# Patient Record
(demographics unavailable — no encounter records)

---

## 2025-02-21 NOTE — HISTORY OF PRESENT ILLNESS
[FreeTextEntry1] : The patient is a 30-year-old G1, P1 premenopausal white female of [place] and [place] descent.  She underwent menarche at age 15.  Her mother is Wendi Saldivar who is a patient of ours who underwent bilateral nipple sparing mastectomies for breast cancer at age 44 and is doing well.  Her mother tested BRCA1 positive with an IVS8+2TA mutation.  She has a maternal grandmother who had breast cancer at age 44 and an ovarian cancer at age 49.  Her paternal grandfather had bladder cancer in his 60s and her maternal grandfather had lung and prostate cancer.  The patient herself underwent [employer] genetic panel testing in December 2020 and was found to have the same BRCA1 mutation as her mother which is also known as a c.547+[id number]>A mutation.  The patient has a history of PCOS and is on Loestrin.  She understands the risk reduction strategies for breast cancer including benefits for risk reduction prophylactic mastectomy, tamoxifen for risk reduction, as well as the need for close surveillance with mammography, ultrasound, and MRIs.  She did undergo a left breast 1:00 MRI guided core biopsy on December 9, 2021 for an MRI finding which showed benign fibrocystic changes and fibroadenomatoid change.  She is about 1 year after her pregnancy and stopped breast-feeding in December 2024.  I have been holding off on her breast imaging secondary to her pregnancy and breast-feeding.  She comes in now for routine follow-up and close surveillance given this known BRCA1 mutation.

## 2025-02-21 NOTE — PAST MEDICAL HISTORY
[Menstruating] : The patient is menstruating [Menarche Age ____] : age at menarche was [unfilled] [Total Preg ___] : G[unfilled] [History of Hormone Replacement Treatment] : has no history of hormone replacement treatment

## 2025-02-21 NOTE — ASSESSMENT
[FreeTextEntry1] : The patient is a 30-year-old G1, P1 premenopausal white female of [place] and [place] descent. She underwent menarche at age 15. Her mother is Wendi Saldviar who is a patient of ours who underwent bilateral nipple sparing mastectomies for breast cancer at age 44 and is doing well. Her mother tested BRCA1 positive with an IVS8+2TA mutation. She has a maternal grandmother who had breast cancer at age 44 and an ovarian cancer at age 49. Her paternal grandfather had bladder cancer in his 60s and her maternal grandfather had lung and prostate cancer. The patient herself underwent [employer] genetic panel testing in December 2020 and was found to have the same BRCA1 mutation as her mother which is also known as a c.547+[id number]>A mutation. The patient has a history of PCOS and is on Loestrin. I spoke to the patient at length regarding her BRCA1 mutation and she is well aware of her increased risk of breast, ovarian, pancreatic, and melanoma cancers. I spoke to her about risk reduction techniques including risk reduction prophylactic mastectomy. The patient has been made aware of our technique of nipple sparing mastectomy with direct to implant reconstruction and she is well aware of this given her mother's history. She has chosen close surveillance for now with routine 6-month breast exams and to start yearly mammography, ultrasound, and MRIs. She understands the use of tamoxifen for risk reduction but given her age, she was not interested. She understands the need for close GYN follow-up for surveillance for ovarian cancer. The patient was seen by Leonora Castillo, our genetic counselor, and had a full discussion about screening and risk reduction options. She was also offered a plastic surgery consultation to discuss any future reconstruction possibilities after prophylactic mastectomy and did see Dr. Nicholas for discussion in the past. She did undergo a left breast 1:00 MRI guided core biopsy on December 9, 2021 for an MRI finding which showed benign fibrocystic changes and fibroadenomatoid change. She underwent her last bilateral mammography and ultrasound on June 16, 2023 performed at [facility] which showed extremely dense changes and no suspicious findings. She underwent her last bilateral breast MRI at [facility] on September 14, 2022 which showed some bilateral background enhancement but no suspicious findings.  She is about a year out after her pregnancy and she stopped breast-feeding around December 2024 so I had to hold off on imaging. On exam today, I cannot feel any suspicious densities in either breast and her breasts have softened up somewhat with pregnancy and breast-feeding.  I would like to see her again in 6 months around September 2025 due to her known BRCA1 mutation. I will restart imaging now and she was given scripts for bilateral mammography and ultrasound as well as an MRI which I would like her to get now prior to any further pregnancies.  The patient understands and agrees to proceed as planned.  The patient's gynecologist is at [facility] and I did refer the patient to Dr. Tolliver for consultation regarding ovarian cancer risk reduction screening and surveillance.

## 2025-02-21 NOTE — PHYSICAL EXAM
[Normocephalic] : normocephalic [Atraumatic] : atraumatic [EOMI] : extra ocular movement intact [Supple] : supple [No Supraclavicular Adenopathy] : no supraclavicular adenopathy [No Cervical Adenopathy] : no cervical adenopathy [Examined in the supine and seated position] : examined in the supine and seated position [Symmetrical] : symmetrical [No dominant masses] : no dominant masses in right breast  [No dominant masses] : no dominant masses left breast [No Nipple Retraction] : no left nipple retraction [No Nipple Discharge] : no left nipple discharge [Breast Mass Right Breast ___cm] : no masses [Breast Mass Left Breast ___cm] : no masses [No Axillary Lymphadenopathy] : no left axillary lymphadenopathy [No Edema] : no edema [No Rashes] : no rashes [No Ulceration] : no ulceration [Breast Nipple Inversion] : nipples not inverted [Breast Nipple Retraction] : nipples not retracted [Breast Nipple Flattening] : nipples not flattened [Breast Nipple Fissures] : nipples not fissured [Breast Abnormal Lactation (Galactorrhea)] : no galactorrhea [Breast Abnormal Secretion Bloody Fluid] : no bloody discharge [Breast Abnormal Secretion Serous Fluid] : no serous discharge [Breast Abnormal Secretion Opalescent Fluid] : no milky discharge [de-identified] : On exam, the patient has B-cup breasts.  She is about a year out after her pregnancy and she stopped breast-feeding in December 2024 and her breasts are much softer with no suspicious masses.  She has no axillary, supraclavicular, or cervical adenopathy.

## 2025-02-21 NOTE — ASSESSMENT
[FreeTextEntry1] : The patient is a 30-year-old G1, P1 premenopausal white female of [place] and [place] citizen descent. She underwent menarche at age 15. Her mother is Wendi Saldivar who is a patient of ours who underwent bilateral nipple sparing mastectomies for breast cancer at age 44 and is doing well. Her mother tested BRCA1 positive with an IVS8+2TA mutation. She has a maternal grandmother who had breast cancer at age 44 and an ovarian cancer at age 49. Her paternal grandfather had bladder cancer in his 60s and her maternal grandfather had lung and prostate cancer. The patient herself underwent [employer] genetic panel testing in December 2020 and was found to have the same BRCA1 mutation as her mother which is also known as a c.547+[id number]>A mutation. The patient has a history of PCOS and is on Loestrin. I spoke to the patient at length regarding her BRCA1 mutation and she is well aware of her increased risk of breast, ovarian, pancreatic, and melanoma cancers. I spoke to her about risk reduction techniques including risk reduction prophylactic mastectomy. The patient has been made aware of our technique of nipple sparing mastectomy with direct to implant reconstruction and she is well aware of this given her mother's history. She has chosen close surveillance for now with routine 6-month breast exams and to start yearly mammography, ultrasound, and MRIs. She understands the use of tamoxifen for risk reduction but given her age, she was not interested. She understands the need for close GYN follow-up for surveillance for ovarian cancer. The patient was seen by Leonora Castillo, our genetic counselor, and had a full discussion about screening and risk reduction options. She was also offered a plastic surgery consultation to discuss any future reconstruction possibilities after prophylactic mastectomy and did see Dr. Nicholas for discussion in the past. She did undergo a left breast 1:00 MRI guided core biopsy on December 9, 2021 for an MRI finding which showed benign fibrocystic changes and fibroadenomatoid change. She underwent her last bilateral mammography and ultrasound on June 16, 2023 performed at [facility] which showed extremely dense changes and no suspicious findings. She underwent her last bilateral breast MRI at [facility] on September 14, 2022 which showed some bilateral background enhancement but no suspicious findings.  She is about a year out after her pregnancy and she stopped breast-feeding around December 2024 so I had to hold off on imaging. On exam today, I cannot feel any suspicious densities in either breast and her breasts have softened up somewhat with pregnancy and breast-feeding.  I would like to see her again in 6 months around September 2025 due to her known BRCA1 mutation. I will restart imaging now and she was given scripts for bilateral mammography and ultrasound as well as an MRI which I would like her to get now prior to any further pregnancies.  The patient understands and agrees to proceed as planned.  The patient's gynecologist is at [facility] and I did refer the patient to Dr. Tolliver for consultation regarding ovarian cancer risk reduction screening and surveillance.

## 2025-02-21 NOTE — REASON FOR VISIT
[Follow-Up: _____] : a [unfilled] follow-up visit [FreeTextEntry1] : The patient comes in with a strong family history of breast cancer and a known BRCA1 mutation.

## 2025-02-21 NOTE — HISTORY OF PRESENT ILLNESS
[FreeTextEntry1] : The patient is a 30-year-old G1, P1 premenopausal white female of [place] and [place] descent.  She underwent menarche at age 15.  Her mother is Wendi Saldivar who is a patient of ours who underwent bilateral nipple sparing mastectomies for breast cancer at age 44 and is doing well.  Her mother tested BRCA1 positive with an IVS8+2TA mutation.  She has a maternal grandmother who had breast cancer at age 44 and an ovarian cancer at age 49.  Her paternal grandfather had bladder cancer in his 60s and her maternal grandfather had lung and prostate cancer.  The patient herself underwent [employer] genetic panel testing in December 2020 and was found to have the same BRCA1 mutation as her mother which is also known as a c.547+2T>A mutation.  The patient has a history of PCOS and is on Loestrin.  She understands the risk reduction strategies for breast cancer including benefits for risk reduction prophylactic mastectomy, tamoxifen for risk reduction, as well as the need for close surveillance with mammography, ultrasound, and MRIs.  She did undergo a left breast 1:00 MRI guided core biopsy on December 9, 2021 for an MRI finding which showed benign fibrocystic changes and fibroadenomatoid change.  She is about 1 year after her pregnancy and stopped breast-feeding in December 2024.  I have been holding off on her breast imaging secondary to her pregnancy and breast-feeding.  She comes in now for routine follow-up and close surveillance given this known BRCA1 mutation.

## 2025-02-21 NOTE — PHYSICAL EXAM
[Normocephalic] : normocephalic [Atraumatic] : atraumatic [EOMI] : extra ocular movement intact [Supple] : supple [No Supraclavicular Adenopathy] : no supraclavicular adenopathy [No Cervical Adenopathy] : no cervical adenopathy [Examined in the supine and seated position] : examined in the supine and seated position [Symmetrical] : symmetrical [No dominant masses] : no dominant masses in right breast  [No dominant masses] : no dominant masses left breast [No Nipple Retraction] : no left nipple retraction [No Nipple Discharge] : no left nipple discharge [Breast Mass Right Breast ___cm] : no masses [Breast Mass Left Breast ___cm] : no masses [No Axillary Lymphadenopathy] : no left axillary lymphadenopathy [No Edema] : no edema [No Rashes] : no rashes [No Ulceration] : no ulceration [Breast Nipple Inversion] : nipples not inverted [Breast Nipple Retraction] : nipples not retracted [Breast Nipple Flattening] : nipples not flattened [Breast Nipple Fissures] : nipples not fissured [Breast Abnormal Lactation (Galactorrhea)] : no galactorrhea [Breast Abnormal Secretion Bloody Fluid] : no bloody discharge [Breast Abnormal Secretion Serous Fluid] : no serous discharge [Breast Abnormal Secretion Opalescent Fluid] : no milky discharge [de-identified] : On exam, the patient has B-cup breasts.  She is about a year out after her pregnancy and she stopped breast-feeding in December 2024 and her breasts are much softer with no suspicious masses.  She has no axillary, supraclavicular, or cervical adenopathy.

## 2025-02-21 NOTE — ADDENDUM
[FreeTextEntry1] : I spent greater than 75% of the consultation in face-to-face counseling and coordination care in this patient with a strong family history of breast cancer and a known BRCA1 mutation who comes in now for breast cancer screening/surveillance.

## 2025-06-25 NOTE — HISTORY OF PRESENT ILLNESS
[FreeTextEntry1] : 30 year old female referred by Dr. Greenberg and  Dr. Laird for evaluation of Right upper lobe groundglass nodule that was incidentally found on Breast MRI. PMHX s/p left breast biopsy 6/12/2025 ~ biopsy showed benign tissue

## 2025-06-25 NOTE — DATA REVIEWED
[FreeTextEntry1] : 6/12/2025 CT Chest as follows: Impression: Corresponding to the nodule seen on breast MRI, there is a stable 1.7 cm groundglass nodule in the right upper lobe on axial image 39. Cannot exclude neoplasm or infectious/inflammatory process. Recommend pulmonologist evaluation. 2 hypodense hepatic lesions are noted, measuring more than simple cysts. Differential includes hemangiomas or other lesion. Recommend sonogram or abdominal MRI before and after